# Patient Record
Sex: FEMALE | Race: WHITE | Employment: OTHER | URBAN - METROPOLITAN AREA
[De-identification: names, ages, dates, MRNs, and addresses within clinical notes are randomized per-mention and may not be internally consistent; named-entity substitution may affect disease eponyms.]

---

## 2019-07-29 ENCOUNTER — APPOINTMENT (OUTPATIENT)
Dept: GENERAL RADIOLOGY | Age: 84
End: 2019-07-29
Payer: MEDICARE

## 2019-07-29 ENCOUNTER — HOSPITAL ENCOUNTER (EMERGENCY)
Age: 84
Discharge: NURSING, REHAB OR LONG-TERM CARE | End: 2019-07-30
Attending: EMERGENCY MEDICINE
Payer: MEDICARE

## 2019-07-29 DIAGNOSIS — S90.32XA CONTUSION OF LEFT FOOT, INITIAL ENCOUNTER: ICD-10-CM

## 2019-07-29 DIAGNOSIS — M79.672 LEFT FOOT PAIN: Primary | ICD-10-CM

## 2019-07-29 DIAGNOSIS — M25.562 ACUTE PAIN OF LEFT KNEE: ICD-10-CM
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
## 2019-07-29 PROCEDURE — 73560 X-RAY EXAM OF KNEE 1 OR 2: CPT

## 2019-07-29 PROCEDURE — 99285 EMERGENCY DEPT VISIT HI MDM: CPT

## 2019-07-29 PROCEDURE — 73620 X-RAY EXAM OF FOOT: CPT

## 2019-07-29 ASSESSMENT — PAIN SCALES - GENERAL: PAINLEVEL_OUTOF10: 10

## 2019-07-29 ASSESSMENT — PAIN DESCRIPTION - LOCATION: LOCATION: FOOT

## 2019-07-29 ASSESSMENT — PAIN DESCRIPTION - PAIN TYPE: TYPE: ACUTE PAIN

## 2019-07-30 ENCOUNTER — APPOINTMENT (OUTPATIENT)
Dept: CT IMAGING | Age: 84
End: 2019-07-30
Payer: MEDICARE

## 2019-07-30 VITALS
WEIGHT: 174 LBS | HEART RATE: 63 BPM | SYSTOLIC BLOOD PRESSURE: 188 MMHG | HEIGHT: 62 IN | RESPIRATION RATE: 17 BRPM | OXYGEN SATURATION: 93 % | DIASTOLIC BLOOD PRESSURE: 86 MMHG | BODY MASS INDEX: 32.02 KG/M2 | TEMPERATURE: 97.8 F

## 2019-07-30 PROCEDURE — 6370000000 HC RX 637 (ALT 250 FOR IP): Performed by: EMERGENCY MEDICINE

## 2019-07-30 PROCEDURE — 70450 CT HEAD/BRAIN W/O DYE: CPT

## 2019-07-30 RX ORDER — ACETAMINOPHEN 500 MG
1000 TABLET ORAL ONCE
Status: COMPLETED | OUTPATIENT
Start: 2019-07-30 | End: 2019-07-30

## 2019-07-30 RX ADMIN — ACETAMINOPHEN 1000 MG: 500 TABLET ORAL at 01:57

## 2019-07-30 ASSESSMENT — PAIN SCALES - GENERAL: PAINLEVEL_OUTOF10: 10

## 2019-08-01 NOTE — ED PROVIDER NOTES
Allergies   Allergen Reactions    Codeine        REVIEW OF SYSTEMS  10 systems reviewed, pertinent positives per HPI otherwise noted to be negative. PHYSICAL EXAM  BP (!) 188/86   Pulse 63   Temp 97.8 °F (36.6 °C) (Oral)   Resp 17   Ht 5' 2\" (1.575 m)   Wt 174 lb (78.9 kg)   SpO2 93%   BMI 31.83 kg/m²   GENERAL APPEARANCE: Awake and alert. Cooperative. No acute distress. HEAD: Normocephalic. Atraumatic. EYES: PERRL. EOM's grossly intact. ENT: Mucous membranes are moist.   NECK: Supple. HEART: RRR. CHEST/LUNGS: Chest atraumatic, nontender, respirations unlabored. CTAB. Good air exchange. Speaking comfortably in full sentences. BACK: No midline spinal tenderness or step-off. ABDOMEN: Soft. Non-distended. Non-tender. No guarding or rebound. Normal bowel sounds. EXTREMITIES: mild swelling and ecchymosis to dorsum of the L foot. Moves all extremities equally. All extremities neurovascularly intact. RECTAL/: Deferred  SKIN: Warm and dry. No acute rashes. NEUROLOGICAL: Alert and oriented. CN 2-12 intact, No gross facial drooping. Strength 5/5, sensation intact. Normal coordination. Gait normal.   PSYCHIATRIC: Normal mood and affect. RADIOLOGY  X-RAYS:  I have reviewed radiologic plain film image(s). ALL OTHER NON-PLAIN FILM IMAGES SUCH AS CT, ULTRASOUND AND MRI HAVE BEEN READ BY THE RADIOLOGIST. CT HEAD WO CONTRAST   Final Result   No acute intracranial abnormality. XR KNEE LEFT (1-2 VIEWS)   Final Result   Nonspecific joint effusion. No acute osseous abnormality. XR FOOT LEFT (2 VIEWS)   Final Result   Limited evaluation due to osteopenia. No acute osseous abnormality. Scattered degenerative changes. ED COURSE/MDM  Patient seen and evaluated. Imaging shows no acute fx or dislocation, no deformity on exam. Pt can be dc back home to [facility] via transport. Family bedside updated, questions answered.    All diagnostic tests reviewed and

## 2020-01-12 ENCOUNTER — HOSPITAL ENCOUNTER (EMERGENCY)
Age: 85
Discharge: HOME | End: 2020-01-12
Attending: EMERGENCY MEDICINE
Payer: MEDICARE

## 2020-01-12 ENCOUNTER — APPOINTMENT (OUTPATIENT)
Dept: CT IMAGING | Age: 85
End: 2020-01-12
Payer: MEDICARE

## 2020-01-12 VITALS
TEMPERATURE: 97.9 F | OXYGEN SATURATION: 94 % | WEIGHT: 180 LBS | RESPIRATION RATE: 18 BRPM | HEART RATE: 88 BPM | SYSTOLIC BLOOD PRESSURE: 154 MMHG | DIASTOLIC BLOOD PRESSURE: 90 MMHG | BODY MASS INDEX: 32.92 KG/M2

## 2020-01-12 PROCEDURE — 70450 CT HEAD/BRAIN W/O DYE: CPT

## 2020-01-12 PROCEDURE — 72125 CT NECK SPINE W/O DYE: CPT

## 2020-01-12 PROCEDURE — 6370000000 HC RX 637 (ALT 250 FOR IP): Performed by: EMERGENCY MEDICINE

## 2020-01-12 PROCEDURE — 99284 EMERGENCY DEPT VISIT MOD MDM: CPT

## 2020-01-12 RX ORDER — HYDROCODONE BITARTRATE AND ACETAMINOPHEN 5; 325 MG/1; MG/1
1 TABLET ORAL ONCE
Status: COMPLETED | OUTPATIENT
Start: 2020-01-12 | End: 2020-01-12

## 2020-01-12 RX ADMIN — HYDROCODONE BITARTRATE AND ACETAMINOPHEN 1 TABLET: 5; 325 TABLET ORAL at 16:50

## 2020-01-12 ASSESSMENT — ENCOUNTER SYMPTOMS
SHORTNESS OF BREATH: 0
VOMITING: 0
ABDOMINAL PAIN: 0
BACK PAIN: 0

## 2020-01-12 ASSESSMENT — PAIN SCALES - GENERAL: PAINLEVEL_OUTOF10: 6

## 2020-01-12 NOTE — ED NOTES
Bed: 14  Expected date:   Expected time:   Means of arrival:   Comments:  charli Gold RN  01/12/20 1752

## 2020-01-12 NOTE — ED PROVIDER NOTES
headaches. Psychiatric/Behavioral: Negative for confusion. Positives and Pertinent negatives as per HPI. PASTMEDICAL HISTORY     Past Medical History:   Diagnosis Date    [facility] ([facility] [address]     Diabetes mellitus ([facility] [address]     Diverticulosis of colon     Hypertension     Irregular heart beat     Osteoarthritis     Type II or unspecified type diabetes mellitus without mention of complication, not stated as uncontrolled     Unspecified cerebral artery occlusion with cerebral infarction          SURGICAL HISTORY       Past Surgical History:   Procedure Laterality Date    ANKLE SURGERY      left    COLONOSCOPY  02/06/2011    JOINT REPLACEMENT  3/11/2002    knee left    OTHER SURGICAL HISTORY Left 2/15/16    Left hip injection    TUMOR REMOVAL      breast    WRIST SURGERY           CURRENT MEDICATIONS       Current Discharge Medication List      CONTINUE these medications which have NOT CHANGED    Details   aspirin 81 MG EC tablet Take 1 tablet by mouth daily.   Qty: 30 tablet, Refills: 0      traMADol (ULTRAM) 50 MG tablet TAKE ONE TABLET BY MOUTH EVERY 6 HOURS AS NEEDED FOR PAIN  Qty: 100 tablet, Refills: 3      fluticasone (FLONASE) 50 MCG/ACT nasal spray USE ONE SPRAY IN EACH NOSTRIL EVERY DAY  Qty: 16 g, Refills: 10      metoprolol (LOPRESSOR) 100 MG tablet TAKE ONE TABLET BY MOUTH EVERY 12 HOURS  Qty: 60 tablet, Refills: 11      digoxin (LANOXIN) 0.125 MG tablet TAKE ONE TABLET BY MOUTH EVERY DAY  Qty: 30 tablet, Refills: 5      VESICARE 5 MG tablet TAKE ONE TABLET BY MOUTH EVERY DAY  Qty: 30 tablet, Refills: 11      diltiazem (CARDIZEM CD) 180 MG ER capsule TAKE ONE CAPSULE BY MOUTH EVERY DAY  Qty: 30 capsule, Refills: 6      JANUMET  MG per tablet TAKE ONE TABLET BY MOUTH TWICE DAILY  Qty: 60 tablet, Refills: 11      glipiZIDE (GLUCOTROL) 2.5 MG CR tablet TAKE ONE TABLET BY MOUTH EVERY DAY  Qty: 90 tablet, Refills: 3      DANAE CONTOUR TEST strip AS DIRECTED  Qty: 100 strip, Refills: 11 Multiple Vitamins-Minerals (OCUVITE) TABS tablet Take 1 tablet by mouth daily. Furosemide (LASIX PO) Take 20 mg by mouth. Indications: 3x week MON, Wed, Fri             ALLERGIES     Codeine    FAMILY HISTORY     History reviewed. No pertinent family history. SOCIAL HISTORY       Social History     Socioeconomic History    Marital status:      Spouse name: None    Number of children: None    Years of education: None    Highest education level: None   Occupational History    None   Social Needs    Financial resource strain: None    Food insecurity:     Worry: None     Inability: None    Transportation needs:     Medical: None     Non-medical: None   Tobacco Use    Smoking status: Never Smoker    Smokeless tobacco: Never Used   Substance and Sexual Activity    Alcohol use: No    Drug use: No    Sexual activity: Never   Lifestyle    Physical activity:     Days per week: None     Minutes per session: None    Stress: None   Relationships    Social connections:     Talks on phone: None     Gets together: None     Attends Voodoo service: None     Active member of club or organization: None     Attends meetings of clubs or organizations: None     Relationship status: None    Intimate partner violence:     Fear of current or ex partner: None     Emotionally abused: None     Physically abused: None     Forced sexual activity: None   Other Topics Concern    None   Social History Narrative    None       SCREENINGS                PHYSICAL EXAM    (up to 7 for level 4, 8 or more for level 5)     ED Triage Vitals   BP Temp Temp src Pulse Resp SpO2 Height Weight   -- -- -- -- -- -- -- --       Physical Exam  Vitals signs and nursing note reviewed. Constitutional:       General: She is awake. She is in acute distress (mild). Appearance: Normal appearance. She is well-developed, well-groomed and normal weight. She is not ill-appearing, toxic-appearing or diaphoretic.    HENT: Head: Normocephalic and atraumatic. Nose: Nose normal.      Mouth/Throat:      Mouth: Mucous membranes are dry. Eyes:      Pupils: Pupils are equal, round, and reactive to light. Neck:      Musculoskeletal: Neck supple. Spinous process tenderness and muscular tenderness present. No edema, erythema, neck rigidity, crepitus or injury. Trachea: Trachea normal. No tracheal tenderness. Cardiovascular:      Rate and Rhythm: Normal rate and regular rhythm. Pulses: Normal pulses. Pulmonary:      Effort: Pulmonary effort is normal. No respiratory distress. Breath sounds: Normal breath sounds. No stridor. No wheezing, rhonchi or rales. Abdominal:      General: Abdomen is flat. Bowel sounds are normal. There is no distension. Tenderness: There is no tenderness. There is no right CVA tenderness, left CVA tenderness, guarding or rebound. Musculoskeletal:      Cervical back: She exhibits tenderness and bony tenderness. Thoracic back: She exhibits normal range of motion, no tenderness and no bony tenderness. Lumbar back: She exhibits normal range of motion, no tenderness and no bony tenderness. Comments: MSK: Normal range of motion of bilateral shoulders, elbows, wrists, hips, knees, ankles and nontender to palpation of all joints      Skin:     General: Skin is warm and dry. Coloration: Skin is not jaundiced or pale. Findings: No bruising, erythema, lesion or rash. Neurological:      General: No focal deficit present. Mental Status: She is alert. Mental status is at baseline. Motor: No weakness. Psychiatric:         Mood and Affect: Mood is anxious. Behavior: Behavior is cooperative. DIAGNOSTIC RESULTS   :    Labs Reviewed - No data to display    All other labs were within normal range or not returned asof this dictation. EKG:  All EKG's are interpreted by the Emergency Department Physician who either signs or Co-signs this chart in the absence of a cardiologist.        RADIOLOGY:   Non-plain film images such as CT, Ultrasound and MRI are read by the radiologist. Plainradiographic images are visualized and preliminarily interpreted by the  ED Provider with the belowfindings:        Interpretation per the Radiologist below, if available at the time of this note:    CT Head WO Contrast   Final Result   No acute intracranial abnormality. CT Cervical Spine WO Contrast   Final Result   No acute abnormality of the cervical spine. Spoke with Dr. Puja Perrin at 8214 and he reviewed series 2 image 37 and states that is a nutrient vessel and no concerns for fracture. PROCEDURES   Unless otherwise noted below, none     Procedures    CRITICAL CARE TIME   N/A    CONSULTS:  None    EMERGENCY DEPARTMENT COURSE and DIFFERENTIAL DIAGNOSIS/MDM:   Vitals:    Vitals:    01/12/20 1617 01/12/20 1622 01/12/20 1653 01/12/20 1717   BP:  (!) 203/84 (!) 202/112 138/85   Pulse: 98  90 90   Resp: 16  16 18   Temp: 97.9 °F (36.6 °C)      TempSrc: Oral      SpO2: 98%  94% 95%   Weight: 180 lb (81.6 kg)          Patient was given the following medications:  Medications   HYDROcodone-acetaminophen (NORCO) 5-325 MG per tablet 1 tablet (1 tablet Oral Given 1/12/20 1650)     Patient was evaluated due to concern for fall after walker tipped over causing significant neck pain a few hours ago. She will be placed in a c-collar and we will obtain a CT of the head and cervical spine to assess for any fracture. No focal neuro deficits on exam.  No other complaints of pain at this time. CT of the head and cervical spine did not show any significant findings related to acute trauma. Therefore, c-collar was cleared at 1720 after confirming with radiologist no concerning findings.   She had normal range of motion of cervical spine after c-collar was cleared although did have diffuse tenderness to midline and paraspinal regions including to the skull base involving the

## 2020-01-12 NOTE — ED NOTES
Called report to Vivi at [place] at this time. Vivi informed me she had spoke with patients son. Also that since we were unable to reach him while patient was present in the ER that she will reach out to him and inform him of disposition.  Discussed possible need for MRI should patient have continued pain over the next 2-3 days     Kelvin Arteaga RN  01/12/20 [id number]

## 2020-07-08 ENCOUNTER — OFFICE VISIT (OUTPATIENT)
Dept: ORTHOPEDIC SURGERY | Age: 85
End: 2020-07-08
Payer: MEDICARE

## 2020-07-08 VITALS — WEIGHT: 179.9 LBS | HEIGHT: 62 IN | BODY MASS INDEX: 33.1 KG/M2

## 2020-07-08 PROCEDURE — 1123F ACP DISCUSS/DSCN MKR DOCD: CPT | Performed by: PODIATRIST

## 2020-07-08 PROCEDURE — 99203 OFFICE O/P NEW LOW 30 MIN: CPT | Performed by: PODIATRIST

## 2020-07-08 PROCEDURE — G8417 CALC BMI ABV UP PARAM F/U: HCPCS | Performed by: PODIATRIST

## 2020-07-08 PROCEDURE — 1036F TOBACCO NON-USER: CPT | Performed by: PODIATRIST

## 2020-07-08 PROCEDURE — G8427 DOCREV CUR MEDS BY ELIG CLIN: HCPCS | Performed by: PODIATRIST

## 2020-07-08 PROCEDURE — 4040F PNEUMOC VAC/ADMIN/RCVD: CPT | Performed by: PODIATRIST

## 2020-07-08 PROCEDURE — L3260 AMBULATORY SURGICAL BOOT EAC: HCPCS | Performed by: PODIATRIST

## 2020-07-08 PROCEDURE — 1090F PRES/ABSN URINE INCON ASSESS: CPT | Performed by: PODIATRIST

## 2020-07-08 NOTE — PROGRESS NOTES
HISTORY OF PRESENT ILLNESS: This is an initial visit for an 22-year-old female who is with her son with a chief complaint of right foot pain. She is hearing impaired. The patient twisted the ankle and foot last week. Even before then though she was having pain in her foot. The fall exacerbated her symptoms. Pain is present with weightbearing  and twisting motions of the foot. There is very little to no pain at the ankle. The pain is best relieved with rest, ice, and  elevation. FAMILY HISTORY: Documented in chart. SOCIAL HISTORY:  Documented in chart. REVIEW OF SYSTEMS: The patient denies any fever, chills, or night sweats. The patient also denies developing any type of rash. The patient denies any problems with cardiovascular, pulmonary, gastrointestinal, neurologic, urologic, genitourinary, psychiatric, dermatologic, and HEENT systems. PHYSICAL EXAMINATION: The area of greatest palpable tenderness is at the  Dorsal lateral aspect of the right forefoot. She has moderate pain also over the dorsal central aspect of the midfoot. There is mild pitting edema of the right ankle. She has very mild edema to the midfoot and forefoot. She has extensive ecchymosis in the right lower leg but minimal ecchymosis in the foot. She has multiple varicosities. No open lesions or fracture blisters are present. Pedal pulses are palpable bilateral.  The sensation is grossly intact bilateral. There is no pain over the deltoid  ligament. There is no pain over the Achilles tendon and this is palpated to be  intact. Thompsonâs test is negative for a complete rupture of the Achilles  tendon. An anterior drawer test at the ankle is negative for any gross instability. The patient is able to dorsiflex and plantarflex the foot, as well as  hao and invert independently. RADIOGRAPHS: Three nonweightbearing x-ray views of the right foot were taken. No acute fractures or dislocations are noted. Moderate osteoarthritic changes are noted throughout the midfoot. 3 nonweightbearing x-ray views of the right ankle were also taken. These also do not demonstrate any acute fracture or dislocation. ASSESSMENT: Right Midfoot Sprain, tenosynovitis right foot and ankle, midfoot osteoarthritis right. PLAN: The patient was educated on the pathology and its treatment options. I think her fall exacerbated some arthritic symptoms in her midfoot as well as causing a sprain. A postop shoe and temporary arch support was applied to the patientâs right lower extremity. The patient is allowed to bear weight as  tolerated. Range of motion exercises were prescribed to the patient. Rest, ice,  and elevation are to be used to relieve pain. Overall activity is to be decreased  in the short-term. I will see her back in 2 weeks. Procedures    Darco Post-op Shoe Brace     Patient was prescribed a Darco Post-Op Shoe. The right foot will require stabilization / immobilization from this semi-rigid / rigid orthosis to improve their function. The orthosis will assist in protecting the affected area, provide functional support and facilitate healing. Patient was instructed to progress ambulation weight bearing as tolerated in the device. The patient was educated and fit by a healthcare professional with expert knowledge and specialization in brace application while under the direct supervision of the treating physician. Verbal and written instructions for the use of and application of this item were provided. They were instructed to contact the office immediately should the brace result in increased pain, decreased sensation, increased swelling or worsening of the condition.

## 2020-07-27 ENCOUNTER — TELEPHONE (OUTPATIENT)
Dept: ORTHOPEDIC SURGERY | Age: 85
End: 2020-07-27

## 2021-11-15 ENCOUNTER — CLINICAL DOCUMENTATION (OUTPATIENT)
Dept: OTHER | Age: 86
End: 2021-11-15